# Patient Record
(demographics unavailable — no encounter records)

---

## 2024-12-16 NOTE — HISTORY OF PRESENT ILLNESS
[de-identified] : while in [place] 10/24 developed dry cough ;seen at er with neg w/u ,since then sporadic mild cough with clear mucus

## 2024-12-16 NOTE — ASSESSMENT
[FreeTextEntry1] : 80 Y OLD FEM WITH PMX OF ON GLIPIZIDE AND JANUMET BID= BSFS  ;HAB1C 6.0 = STOP GLIPIZED TILL NEXT VISIT IN 3 M  DAILY BSFS  HTN - DYSLIPIDEMIA = LABS AND MEDS ORDERED RTO 3 M FOR CPE

## 2025-03-24 NOTE — HEALTH RISK ASSESSMENT
[Good] : ~his/her~  mood as  good [No] : In the past 12 months have you used drugs other than those required for medical reasons? No [No falls in past year] : Patient reported no falls in the past year [Patient refused screening] : Patient refused screening [Never] : Never [MammogramDate] : 07/22 [ColonoscopyDate] : 04/21

## 2025-03-24 NOTE — ASSESSMENT
[FreeTextEntry1] : CPE OF 81 Y OLD FEM WITH PMX OF HTN ,DM ,DYSLIPIDEMIA AND GERD = LABS TODAY  EKG AS PER CARDIO  RTO 3 M

## 2025-03-24 NOTE — HISTORY OF PRESENT ILLNESS
[de-identified] : CARDIO DR BOURNE DECREASED AMLODIPINE FROM 10 TO 5 G RECENTLY ;EKG DONE AS WELL  SEEN BY OPHTHA EVERY 4 M  SEEN BY DR WALLS WITH NL MAMMO 1/25

## 2025-03-24 NOTE — PHYSICAL EXAM
[No Acute Distress] : no acute distress [Normal Sclera/Conjunctiva] : normal sclera/conjunctiva [Normal Outer Ear/Nose] : the outer ears and nose were normal in appearance [No JVD] : no jugular venous distention [Normal] : normal rate, regular rhythm, normal S1 and S2 and no murmur heard [No Edema] : there was no peripheral edema [Soft] : abdomen soft [Non Tender] : non-tender [Normal Bowel Sounds] : normal bowel sounds [No CVA Tenderness] : no CVA  tenderness [No Rash] : no rash [No Focal Deficits] : no focal deficits [Alert and Oriented x3] : oriented to person, place, and time

## 2025-07-16 NOTE — PHYSICAL EXAM
[No Acute Distress] : no acute distress [Normal Sclera/Conjunctiva] : normal sclera/conjunctiva [Normal Outer Ear/Nose] : the outer ears and nose were normal in appearance [Normal] : normal rate, regular rhythm, normal S1 and S2 and no murmur heard [___ +] : bilateral [unfilled]U+ pitting edema to the ankles [Soft] : abdomen soft [Non Tender] : non-tender [Normal Bowel Sounds] : normal bowel sounds [Coordination Grossly Intact] : coordination grossly intact [No Focal Deficits] : no focal deficits [Alert and Oriented x3] : oriented to person, place, and time

## 2025-07-16 NOTE — ASSESSMENT
[FreeTextEntry1] : 81 Y OLD FEM WITH PMX OF THALASEMIA MINOR= CBC  CKD = ON JARIDANCE BY NEPHROLOGY FOR LAST FEW WEEKS  DM = HBA1C  DYSLIPIDEMIA = LABS AND ATORVASTATIN 20 MG ORDERED RTO 3 M